# Patient Record
Sex: FEMALE | Race: WHITE | Employment: UNEMPLOYED | URBAN - METROPOLITAN AREA
[De-identification: names, ages, dates, MRNs, and addresses within clinical notes are randomized per-mention and may not be internally consistent; named-entity substitution may affect disease eponyms.]

---

## 2023-02-16 LAB
AMB EXT HPV: POSITIVE
AMB EXT THINPREP PAP: NEGATIVE

## 2024-04-01 ENCOUNTER — MED REC SCAN ONLY (OUTPATIENT)
Dept: FAMILY MEDICINE CLINIC | Facility: CLINIC | Age: 29
End: 2024-04-01

## 2024-04-09 ENCOUNTER — OFFICE VISIT (OUTPATIENT)
Dept: FAMILY MEDICINE CLINIC | Facility: CLINIC | Age: 29
End: 2024-04-09
Payer: COMMERCIAL

## 2024-04-09 VITALS
HEIGHT: 61.75 IN | RESPIRATION RATE: 16 BRPM | BODY MASS INDEX: 20.5 KG/M2 | HEART RATE: 92 BPM | DIASTOLIC BLOOD PRESSURE: 60 MMHG | OXYGEN SATURATION: 98 % | SYSTOLIC BLOOD PRESSURE: 98 MMHG | WEIGHT: 111.38 LBS

## 2024-04-09 DIAGNOSIS — N39.41 URGENCY INCONTINENCE: ICD-10-CM

## 2024-04-09 DIAGNOSIS — Z00.00 WELL ADULT EXAM: Primary | ICD-10-CM

## 2024-04-09 DIAGNOSIS — Z13.31 NEGATIVE DEPRESSION SCREENING: ICD-10-CM

## 2024-04-09 DIAGNOSIS — Z13.220 SCREENING CHOLESTEROL LEVEL: ICD-10-CM

## 2024-04-09 DIAGNOSIS — O99.019 ANEMIA AFFECTING PREGNANCY, ANTEPARTUM (HCC): ICD-10-CM

## 2024-04-09 DIAGNOSIS — Z86.32 HISTORY OF GESTATIONAL DIABETES MELLITUS (GDM): ICD-10-CM

## 2024-04-09 DIAGNOSIS — Z13.1 DIABETES MELLITUS SCREENING: ICD-10-CM

## 2024-04-09 PROCEDURE — 99385 PREV VISIT NEW AGE 18-39: CPT | Performed by: STUDENT IN AN ORGANIZED HEALTH CARE EDUCATION/TRAINING PROGRAM

## 2024-04-09 NOTE — PROGRESS NOTES
Subjective:      Chief Complaint   Patient presents with    Physical     Had gestational diabetes, anemia, cholestasis  Last pap done  at [email or web address]    Urinary     States after she had her baby she doesn't have the sensation of urinating until she really has to go - feels like it hard for her     HISTORY OF PRESENT ILLNESS  Urinary  Associated symptoms include urinary symptoms.     HPI obtained per patient report.  Arlet Castaneda is a pleasant 29 year old female presenting for her annual physical.   She reports urinary urgency incontinence since her vaginal delivery with epidural in 2023. She denies any numbness, tingling, or LBP.    PAST PATIENT HISTORY  Past Medical History:   Diagnosis Date    Cholestasis during pregnancy     Gestational diabetes (HCC)      (normal spontaneous vaginal delivery) ([facility]) 2023     History reviewed. No pertinent surgical history.    CURRENT MEDICATIONS  No outpatient medications have been marked as taking for the 24 encounter (Office Visit) with Laurie Mosher MD.       HEALTH MAINTENANCE    There is no immunization history on file for this patient.    ALLERGIES AND DRUG REACTIONS  No Known Allergies    History reviewed. No pertinent family history.  Social History     Tobacco Use    Smoking status: Never    Smokeless tobacco: Never   Vaping Use    Vaping Use: Never used   Substance and Sexual Activity    Alcohol use: Not Currently    Drug use: Never       Review of Systems   All other systems reviewed and are negative.         Objective:      BP 98/60   Pulse 92   Resp 16   Ht 5' 1.75\" (1.568 m)   Wt 111 lb 6 oz (50.5 kg)   LMP 2024 (Exact Date)   SpO2 98%   Breastfeeding Yes   BMI 20.54 kg/m²   Body mass index is 20.54 kg/m².    Physical Exam  Vitals reviewed.   Constitutional:       General: She is not in acute distress.     Appearance: She is normal weight. She is not ill-appearing, toxic-appearing or diaphoretic.   HENT:      Head: Normocephalic  and atraumatic.   Eyes:      General: No scleral icterus.        Right eye: No discharge.         Left eye: No discharge.      Extraocular Movements: Extraocular movements intact.      Conjunctiva/sclera: Conjunctivae normal.   Cardiovascular:      Rate and Rhythm: Normal rate and regular rhythm.      Heart sounds: Normal heart sounds.   Pulmonary:      Effort: Pulmonary effort is normal.      Breath sounds: Normal breath sounds.   Abdominal:      General: Abdomen is flat. Bowel sounds are normal. There is no distension.      Palpations: Abdomen is soft. There is no mass.      Tenderness: There is no abdominal tenderness. There is no guarding or rebound.      Hernia: No hernia is present.   Musculoskeletal:      Cervical back: Neck supple.      Right lower leg: No edema.      Left lower leg: No edema.   Skin:     General: Skin is warm and dry.      Coloration: Skin is not jaundiced or pale.      Findings: No bruising, erythema or rash.   Neurological:      Mental Status: She is alert and oriented to person, place, and time.   Psychiatric:         Mood and Affect: Mood normal.            Assessment and Plan:      1. Well adult exam (Primary)  2. Screening cholesterol level  -     Lipid Panel; Future; Expected date: 04/09/2024  3. Diabetes mellitus screening  -     Hemoglobin A1C; Future; Expected date: 04/09/2024  4. History of gestational diabetes mellitus (GDM)  -     Hemoglobin A1C; Future; Expected date: 04/09/2024  5. Anemia affecting pregnancy, antepartum (HCC)  -     Comp Metabolic Panel (14); Future; Expected date: 04/09/2024  -     CBC With Differential With Platelet; Future; Expected date: 04/09/2024  6. Urgency incontinence  -     OP REFERRAL TO [facility]  7. Negative depression screening    Return in about 1 year (around 4/9/2025).    Physical  - will check labs  - UTD on pap and Tdap per report    Urgency incontinence  - recommended kegel exercises and pelvic floor physical  therapy    Patient verbalized understanding of assessment and recommendations. All questions and concerns were addressed.    Electronically signed by Laurie Mosher MD

## 2024-04-15 ENCOUNTER — LAB ENCOUNTER (OUTPATIENT)
Dept: LAB | Age: 29
End: 2024-04-15
Attending: STUDENT IN AN ORGANIZED HEALTH CARE EDUCATION/TRAINING PROGRAM
Payer: COMMERCIAL

## 2024-04-15 DIAGNOSIS — O99.019 ANEMIA AFFECTING PREGNANCY, ANTEPARTUM (HCC): ICD-10-CM

## 2024-04-15 DIAGNOSIS — Z86.32 HISTORY OF GESTATIONAL DIABETES MELLITUS (GDM): ICD-10-CM

## 2024-04-15 DIAGNOSIS — Z13.220 SCREENING CHOLESTEROL LEVEL: ICD-10-CM

## 2024-04-15 DIAGNOSIS — Z13.1 DIABETES MELLITUS SCREENING: ICD-10-CM

## 2024-04-15 LAB
ALBUMIN SERPL-MCNC: 3.9 G/DL (ref 3.4–5)
ALBUMIN/GLOB SERPL: 0.9 {RATIO} (ref 1–2)
ALP LIVER SERPL-CCNC: 80 U/L
ALT SERPL-CCNC: 17 U/L
ANION GAP SERPL CALC-SCNC: 4 MMOL/L (ref 0–18)
AST SERPL-CCNC: 15 U/L (ref 15–37)
BASOPHILS # BLD AUTO: 0.06 X10(3) UL (ref 0–0.2)
BASOPHILS NFR BLD AUTO: 0.8 %
BILIRUB SERPL-MCNC: 0.7 MG/DL (ref 0.1–2)
BUN BLD-MCNC: 19 MG/DL (ref 9–23)
CALCIUM BLD-MCNC: 9.5 MG/DL (ref 8.5–10.1)
CHLORIDE SERPL-SCNC: 105 MMOL/L (ref 98–112)
CHOLEST SERPL-MCNC: 221 MG/DL (ref ?–200)
CO2 SERPL-SCNC: 29 MMOL/L (ref 21–32)
CREAT BLD-MCNC: 0.76 MG/DL
EGFRCR SERPLBLD CKD-EPI 2021: 109 ML/MIN/1.73M2 (ref 60–?)
EOSINOPHIL # BLD AUTO: 0.06 X10(3) UL (ref 0–0.7)
EOSINOPHIL NFR BLD AUTO: 0.8 %
ERYTHROCYTE [DISTWIDTH] IN BLOOD BY AUTOMATED COUNT: 13.6 %
EST. AVERAGE GLUCOSE BLD GHB EST-MCNC: 128 MG/DL (ref 68–126)
FASTING PATIENT LIPID ANSWER: YES
FASTING STATUS PATIENT QL REPORTED: YES
GLOBULIN PLAS-MCNC: 4.3 G/DL (ref 2.8–4.4)
GLUCOSE BLD-MCNC: 105 MG/DL (ref 70–99)
HBA1C MFR BLD: 6.1 % (ref ?–5.7)
HCT VFR BLD AUTO: 42.3 %
HDLC SERPL-MCNC: 62 MG/DL (ref 40–59)
HGB BLD-MCNC: 13.3 G/DL
IMM GRANULOCYTES # BLD AUTO: 0.02 X10(3) UL (ref 0–1)
IMM GRANULOCYTES NFR BLD: 0.3 %
LDLC SERPL CALC-MCNC: 148 MG/DL (ref ?–100)
LYMPHOCYTES # BLD AUTO: 2.93 X10(3) UL (ref 1–4)
LYMPHOCYTES NFR BLD AUTO: 37 %
MCH RBC QN AUTO: 27.1 PG (ref 26–34)
MCHC RBC AUTO-ENTMCNC: 31.4 G/DL (ref 31–37)
MCV RBC AUTO: 86.2 FL
MONOCYTES # BLD AUTO: 0.51 X10(3) UL (ref 0.1–1)
MONOCYTES NFR BLD AUTO: 6.4 %
NEUTROPHILS # BLD AUTO: 4.33 X10 (3) UL (ref 1.5–7.7)
NEUTROPHILS # BLD AUTO: 4.33 X10(3) UL (ref 1.5–7.7)
NEUTROPHILS NFR BLD AUTO: 54.7 %
NONHDLC SERPL-MCNC: 159 MG/DL (ref ?–130)
OSMOLALITY SERPL CALC.SUM OF ELEC: 289 MOSM/KG (ref 275–295)
PLATELET # BLD AUTO: 444 10(3)UL (ref 150–450)
POTASSIUM SERPL-SCNC: 4.1 MMOL/L (ref 3.5–5.1)
PROT SERPL-MCNC: 8.2 G/DL (ref 6.4–8.2)
RBC # BLD AUTO: 4.91 X10(6)UL
SODIUM SERPL-SCNC: 138 MMOL/L (ref 136–145)
TRIGL SERPL-MCNC: 62 MG/DL (ref 30–149)
VLDLC SERPL CALC-MCNC: 12 MG/DL (ref 0–30)
WBC # BLD AUTO: 7.9 X10(3) UL (ref 4–11)

## 2024-04-15 PROCEDURE — 80053 COMPREHEN METABOLIC PANEL: CPT

## 2024-04-15 PROCEDURE — 36415 COLL VENOUS BLD VENIPUNCTURE: CPT

## 2024-04-15 PROCEDURE — 83036 HEMOGLOBIN GLYCOSYLATED A1C: CPT

## 2024-04-15 PROCEDURE — 80061 LIPID PANEL: CPT

## 2024-04-15 PROCEDURE — 85025 COMPLETE CBC W/AUTO DIFF WBC: CPT

## 2024-04-17 ENCOUNTER — TELEPHONE (OUTPATIENT)
Dept: FAMILY MEDICINE CLINIC | Facility: CLINIC | Age: 29
End: 2024-04-17

## 2024-04-17 NOTE — TELEPHONE ENCOUNTER
Received medical records from [facility], Dr. Sintia Ortiz.   -Pap dated 7/27/22  -Colposcopy done 4/10/23    See care everywhere for updated pap from 2/16/23 and note from OB/GYN for OV 5/2/23.

## 2024-04-18 NOTE — PROGRESS NOTES
Heri Nice,    Your lab results have been reviewed. They showed mildly elevated cholesterol and blood sugar (prediabetes) but otherwise looked good. Please continue to work on cutting back on your intake of carbohydrates and saturated fats and exercising regularly. Medications would not be recommended while you are breastfeeding, so we will hold off on this option at this time and plan to recheck your levels in 1 year. Please let me know if you have any questions.    Please take care,  Dr. Mosher

## 2024-10-09 PROBLEM — N84.1 CERVICAL POLYP: Status: ACTIVE | Noted: 2022-07-27

## 2024-10-09 PROBLEM — R87.610 ATYPICAL SQUAMOUS CELLS OF UNDETERMINED SIGNIFICANCE (ASCUS) ON PAPANICOLAOU SMEAR OF CERVIX: Status: ACTIVE | Noted: 2022-07-27

## 2024-10-09 PROBLEM — B97.7 HPV IN FEMALE: Status: ACTIVE | Noted: 2023-04-10

## 2024-10-15 PROBLEM — O40.3XX0 POLYHYDRAMNIOS AFFECTING PREGNANCY IN THIRD TRIMESTER (HCC): Status: ACTIVE | Noted: 2022-12-06

## 2024-10-15 PROBLEM — O24.410 GDM, CLASS A1 (HCC): Status: ACTIVE | Noted: 2022-12-05

## 2024-10-15 PROBLEM — O43.90: Status: ACTIVE | Noted: 2022-07-18

## 2025-03-12 ENCOUNTER — TELEPHONE (OUTPATIENT)
Dept: FAMILY MEDICINE CLINIC | Facility: CLINIC | Age: 30
End: 2025-03-12

## 2025-03-12 NOTE — TELEPHONE ENCOUNTER
I spoke with patient regarding NO SHOW status for office visit on 03/12/2025 with Dr. Mary Kay Clark. Informed patient our office has a $40.00 NO SHOW FEE POLICY so we ask that you call at least 24 hours before your appt time. Informed patient can also use my-chart to cancel appt before 24 hours before your appointment. Patient will be charged $40.00 for any future NO SHOW appointments. Patient verbalized understanding and agrees.

## 2025-03-19 ENCOUNTER — TELEPHONE (OUTPATIENT)
Dept: FAMILY MEDICINE CLINIC | Facility: CLINIC | Age: 30
End: 2025-03-19

## 2025-03-19 ENCOUNTER — OFFICE VISIT (OUTPATIENT)
Dept: FAMILY MEDICINE CLINIC | Facility: CLINIC | Age: 30
End: 2025-03-19
Payer: COMMERCIAL

## 2025-03-19 VITALS
DIASTOLIC BLOOD PRESSURE: 64 MMHG | HEIGHT: 62.8 IN | BODY MASS INDEX: 18.39 KG/M2 | TEMPERATURE: 98 F | SYSTOLIC BLOOD PRESSURE: 98 MMHG | RESPIRATION RATE: 13 BRPM | HEART RATE: 85 BPM | OXYGEN SATURATION: 99 % | WEIGHT: 103.81 LBS

## 2025-03-19 DIAGNOSIS — R87.618 PAP SMEAR ABNORMALITY OF CERVIX/HUMAN PAPILLOMAVIRUS (HPV) POSITIVE: ICD-10-CM

## 2025-03-19 DIAGNOSIS — Z13.29 SCREENING FOR ENDOCRINE, NUTRITIONAL, METABOLIC AND IMMUNITY DISORDER: ICD-10-CM

## 2025-03-19 DIAGNOSIS — Z13.6 SCREENING FOR ISCHEMIC HEART DISEASE: ICD-10-CM

## 2025-03-19 DIAGNOSIS — Z13.0 SCREENING FOR ENDOCRINE, NUTRITIONAL, METABOLIC AND IMMUNITY DISORDER: ICD-10-CM

## 2025-03-19 DIAGNOSIS — R73.03 PREDIABETES: ICD-10-CM

## 2025-03-19 DIAGNOSIS — Z13.228 SCREENING FOR ENDOCRINE, NUTRITIONAL, METABOLIC AND IMMUNITY DISORDER: ICD-10-CM

## 2025-03-19 DIAGNOSIS — Z13.21 SCREENING FOR ENDOCRINE, NUTRITIONAL, METABOLIC AND IMMUNITY DISORDER: ICD-10-CM

## 2025-03-19 DIAGNOSIS — Z00.00 ANNUAL PHYSICAL EXAM: Primary | ICD-10-CM

## 2025-03-19 PROBLEM — O43.90: Status: RESOLVED | Noted: 2022-07-18 | Resolved: 2025-03-19

## 2025-03-19 PROBLEM — R63.6 UNDERWEIGHT: Status: ACTIVE | Noted: 2025-03-19

## 2025-03-19 PROBLEM — O40.3XX0 POLYHYDRAMNIOS AFFECTING PREGNANCY IN THIRD TRIMESTER (HCC): Status: RESOLVED | Noted: 2022-12-06 | Resolved: 2025-03-19

## 2025-03-19 NOTE — PROGRESS NOTES
Subjective:   Arlet Snyder is a 30 year old female who presents for complete px.  She is a new pt to the clinic.  Mother is with her today.  Trinid[place] speaking  used via IPA [id number].     The patient reports:  No changes in nevi.      No changes in vision    Mother wants to get her insulin level checked and vit d     23 pap smear with gyne with pos HPV.  Had colposcopy and was due for repeat cotesting per gyne note in 1 yr and never went     History:   LMP: Patient's last menstrual period was 2025 (exact date).  Last pap date: 23 w/ pos HPV   Abnormal pap? Yes   : 2  Para: 1    Cholestasis in pregnancy   GDM    History/Other:    No Further Nursing Notes to Review  Tobacco Reviewed  Allergies   Reviewed  Medications Reviewed  Medical History Reviewed  Surgical   History Reviewed  OB Status Reviewed  Family History Reviewed  Social   History Reviewed         Tobacco:  She has never smoked tobacco.    No current outpatient medications on file.     Review of Systems:  See HPI     Objective:   BP 98/64 (BP Location: Right arm, Patient Position: Sitting, Cuff Size: adult)   Pulse 85   Temp 97.8 °F (36.6 °C) (Temporal)   Resp 13   Ht 5' 2.8\" (1.595 m)   Wt 103 lb 12.8 oz (47.1 kg)   LMP 2025 (Exact Date)   SpO2 99%   Breastfeeding No   BMI 18.50 kg/m²  Estimated body mass index is 18.5 kg/m² as calculated from the following:    Height as of this encounter: 5' 2.8\" (1.595 m).    Weight as of this encounter: 103 lb 12.8 oz (47.1 kg).  Physical Exam  Constitutional:       General: She is awake.      Appearance: Normal appearance. She is well-developed.   HENT:      Right Ear: Tympanic membrane and ear canal normal.      Left Ear: Tympanic membrane and ear canal normal.      Mouth/Throat:      Mouth: Mucous membranes are moist.      Pharynx: Oropharynx is clear.   Eyes:      Pupils: Pupils are equal, round, and reactive to light.   Cardiovascular:       Rate and Rhythm: Normal rate and regular rhythm.      Pulses: Normal pulses.      Heart sounds: No murmur heard.     No friction rub. No gallop.   Pulmonary:      Effort: Pulmonary effort is normal. No respiratory distress.      Breath sounds: No wheezing, rhonchi or rales.   Abdominal:      General: Bowel sounds are normal. There is no distension.      Palpations: Abdomen is soft.      Tenderness: There is no abdominal tenderness.      Comments: Thin appearance    Musculoskeletal:         General: No tenderness.      Cervical back: Neck supple.      Right lower leg: No edema.      Left lower leg: No edema.   Lymphadenopathy:      Cervical: No cervical adenopathy.   Skin:     General: Skin is warm.   Neurological:      General: No focal deficit present.      Mental Status: She is alert.   Psychiatric:         Mood and Affect: Mood normal.         Speech: Speech normal.         Behavior: Behavior normal. Behavior is cooperative.       Assessment & Plan:   1. Annual physical exam    2. Prediabetes    3. Pap smear abnormality of cervix/human papillomavirus (HPV) positive    4. Screening for ischemic heart disease    5. Screening for endocrine, nutritional, metabolic and immunity disorder        Screening labs-cmp, lipids, cbc, TSH.  Also obtain A1c due to gestational diabetes diagnosis.  Explained we do not check insulin levels in our practice-if needed will refer to endo, but will start with these labs first.  Vit D has never been low and not routine to check this lab  Pap is overdue.  Needs to f/u with gyne due to hx of abnormal pap-gave referral  Immunizations UTD.  Will get Tdap records   Patient counseling: nutrition: stressed importance of moderation in sodium/caffeine intake, saturated fat and cholesterol, caloric balance, sufficient intake of fresh fruits, vegetables, fiber, calcium, iron.  Exercise: stressed the importance of regular exercise  F/u 1 yr for next px or sooner if needed    Mary Kay Clark DO,  03/19/25, 11:31 AM

## 2025-03-19 NOTE — TELEPHONE ENCOUNTER
Patient signed HIPAA medical records authorization form for the Facility identified below to disclose patient health information to [facility]:     Facility / Provider Name: [facility]  Facility Address: [address] [place] [address]  Facility Phone: [phone]  Facility Fax:  [phone]    Specific PHI to be disclosed: immunizations    Medical records timeframe: recent    Medical Records Request/Authorization form has been faxed to above Facility/Provider.   Received fax confirmation.   MR Authorization form has also been sent to scanning.

## 2025-03-19 NOTE — PATIENT INSTRUCTIONS
Labs when fasting 8 hrs or more     Tdap today       F/u 1 yr for next physical or sooner if needed

## 2025-03-24 NOTE — TELEPHONE ENCOUNTER
Received a fax from [facility] stating that the patient did not get a TDAP with their office. Will notify patient she will need to make appointment and get done.

## 2025-04-17 ENCOUNTER — TELEPHONE (OUTPATIENT)
Dept: FAMILY MEDICINE CLINIC | Facility: CLINIC | Age: 30
End: 2025-04-17

## 2025-04-17 PROBLEM — O24.410 GDM, CLASS A1 (HCC): Status: RESOLVED | Noted: 2022-12-05 | Resolved: 2025-04-17

## 2025-04-17 PROBLEM — R73.03 PREDIABETES: Status: ACTIVE | Noted: 2025-04-17

## 2025-04-17 LAB
ABSOLUTE BASOPHILS: 74 CELLS/UL (ref 0–200)
ABSOLUTE EOSINOPHILS: 55 CELLS/UL (ref 15–500)
ABSOLUTE LYMPHOCYTES: 2466 CELLS/UL (ref 850–3900)
ABSOLUTE MONOCYTES: 515 CELLS/UL (ref 200–950)
ABSOLUTE NEUTROPHILS: 6090 CELLS/UL (ref 1500–7800)
ALBUMIN/GLOBULIN RATIO: 1.6 (CALC) (ref 1–2.5)
ALBUMIN: 4.6 G/DL (ref 3.6–5.1)
ALKALINE PHOSPHATASE: 48 U/L (ref 31–125)
ALT: 12 U/L (ref 6–29)
AST: 26 U/L (ref 10–30)
BASOPHILS: 0.8 %
BILIRUBIN, TOTAL: 0.9 MG/DL (ref 0.2–1.2)
BUN: 21 MG/DL (ref 7–25)
CALCIUM: 9.3 MG/DL (ref 8.6–10.2)
CARBON DIOXIDE: 29 MMOL/L (ref 20–32)
CHLORIDE: 102 MMOL/L (ref 98–110)
CHOL/HDLC RATIO: 3.1 (CALC)
CHOLESTEROL, TOTAL: 195 MG/DL
CREATININE: 0.69 MG/DL (ref 0.5–0.97)
EGFR: 120 ML/MIN/1.73M2
EOSINOPHILS: 0.6 %
GLOBULIN: 2.9 G/DL (CALC) (ref 1.9–3.7)
GLUCOSE: 99 MG/DL (ref 65–99)
HDL CHOLESTEROL: 62 MG/DL
HEMATOCRIT: 40.9 % (ref 35–45)
HEMOGLOBIN A1C: 5.7 %
HEMOGLOBIN: 12.9 G/DL (ref 11.7–15.5)
LDL-CHOLESTEROL: 115 MG/DL (CALC)
LYMPHOCYTES: 26.8 %
MCH: 27.6 PG (ref 27–33)
MCHC: 31.5 G/DL (ref 32–36)
MCV: 87.4 FL (ref 80–100)
MONOCYTES: 5.6 %
MPV: 10.2 FL (ref 7.5–12.5)
NEUTROPHILS: 66.2 %
NON-HDL CHOLESTEROL: 133 MG/DL (CALC)
PLATELET COUNT: 455 THOUSAND/UL (ref 140–400)
POTASSIUM: 4 MMOL/L (ref 3.5–5.3)
PROTEIN, TOTAL: 7.5 G/DL (ref 6.1–8.1)
RDW: 13.3 % (ref 11–15)
RED BLOOD CELL COUNT: 4.68 MILLION/UL (ref 3.8–5.1)
SODIUM: 140 MMOL/L (ref 135–146)
TRIGLYCERIDES: 84 MG/DL
TSH W/REFLEX TO FT4: 1.72 MIU/L
WHITE BLOOD CELL COUNT: 9.2 THOUSAND/UL (ref 3.8–10.8)

## 2025-04-17 NOTE — TELEPHONE ENCOUNTER
Labs show plts slightly out of range.  Ok to monitor for now.  LDL or bad cholesterol mildly elevated.  Recommend decreasing saturated fat in diet as well as increasing fish intake.  Could also take otc fish oil or omega 3 fatty acid supplement to help lower level.  A1c in prediabetes range.  Decrease carbohydrates to prevent diabetes.  Also needs to get us her Tdap records and make gyne appt for pap

## 2025-04-22 NOTE — TELEPHONE ENCOUNTER
Discussed lab results and Dr. Clark's recommendations with pt.  Pt. verbalized understanding and does not have any further questions or concerns at this time.

## 2025-05-29 ENCOUNTER — TELEPHONE (OUTPATIENT)
Dept: OBGYN CLINIC | Facility: CLINIC | Age: 30
End: 2025-05-29

## 2025-05-29 NOTE — TELEPHONE ENCOUNTER
Patient was a no show for appt on 05/29/25-called patient with the assistance of an  and advised of $50 late cancellation fee-appt was re-scheduled for patient to 07/03-flag created for no show on today's date.

## 2025-06-27 ENCOUNTER — LAB ENCOUNTER (OUTPATIENT)
Dept: LAB | Age: 30
End: 2025-06-27
Attending: NURSE PRACTITIONER
Payer: COMMERCIAL

## 2025-07-03 ENCOUNTER — TELEPHONE (OUTPATIENT)
Dept: OBGYN CLINIC | Facility: CLINIC | Age: 30
End: 2025-07-03

## 2025-07-03 NOTE — TELEPHONE ENCOUNTER
Called patient with the assistance of  and advised of second no show appt today and $50 charge, added flag to the account, and sent letter to the patient-per call with  today, pt declines to r/s missed appointment.